# Patient Record
Sex: MALE | Race: BLACK OR AFRICAN AMERICAN | NOT HISPANIC OR LATINO | Employment: FULL TIME | URBAN - METROPOLITAN AREA
[De-identification: names, ages, dates, MRNs, and addresses within clinical notes are randomized per-mention and may not be internally consistent; named-entity substitution may affect disease eponyms.]

---

## 2018-09-13 ENCOUNTER — APPOINTMENT (EMERGENCY)
Dept: CT IMAGING | Facility: HOSPITAL | Age: 43
End: 2018-09-13
Payer: COMMERCIAL

## 2018-09-13 ENCOUNTER — APPOINTMENT (EMERGENCY)
Dept: RADIOLOGY | Facility: HOSPITAL | Age: 43
End: 2018-09-13
Payer: COMMERCIAL

## 2018-09-13 ENCOUNTER — HOSPITAL ENCOUNTER (EMERGENCY)
Facility: HOSPITAL | Age: 43
Discharge: HOME | End: 2018-09-13
Attending: EMERGENCY MEDICINE
Payer: COMMERCIAL

## 2018-09-13 VITALS
OXYGEN SATURATION: 100 % | BODY MASS INDEX: 32.58 KG/M2 | HEIGHT: 68 IN | SYSTOLIC BLOOD PRESSURE: 140 MMHG | HEART RATE: 90 BPM | TEMPERATURE: 97.6 F | DIASTOLIC BLOOD PRESSURE: 85 MMHG | RESPIRATION RATE: 20 BRPM | WEIGHT: 215 LBS

## 2018-09-13 DIAGNOSIS — R13.10 DYSPHAGIA: Primary | ICD-10-CM

## 2018-09-13 DIAGNOSIS — K22.89: ICD-10-CM

## 2018-09-13 LAB
ANION GAP SERPL CALCULATED.3IONS-SCNC: 11 MMOL/L (ref 4–13)
BASOPHILS # BLD AUTO: 0 THOUSANDS/ΜL (ref 0–0.1)
BASOPHILS NFR BLD AUTO: 0 % (ref 0–2)
BUN SERPL-MCNC: 15 MG/DL (ref 7–25)
CALCIUM SERPL-MCNC: 10.3 MG/DL (ref 8.6–10.5)
CHLORIDE SERPL-SCNC: 98 MMOL/L (ref 98–107)
CO2 SERPL-SCNC: 29 MMOL/L (ref 21–31)
CREAT SERPL-MCNC: 1.11 MG/DL (ref 0.7–1.3)
EOSINOPHIL # BLD AUTO: 0 THOUSAND/ΜL (ref 0–0.61)
EOSINOPHIL NFR BLD AUTO: 0 % (ref 0–5)
ERYTHROCYTE [DISTWIDTH] IN BLOOD BY AUTOMATED COUNT: 14 % (ref 11.5–14.5)
GFR SERPL CREATININE-BSD FRML MDRD: 92 ML/MIN/1.73SQ M
GLUCOSE SERPL-MCNC: 105 MG/DL (ref 65–99)
HCT VFR BLD AUTO: 47.6 % (ref 36.5–49.3)
HGB BLD-MCNC: 16.4 G/DL (ref 14–18)
LYMPHOCYTES # BLD AUTO: 1.2 THOUSANDS/ΜL (ref 0.6–4.47)
LYMPHOCYTES NFR BLD AUTO: 9 % (ref 21–51)
MCH RBC QN AUTO: 30.4 PG (ref 26–34)
MCHC RBC AUTO-ENTMCNC: 34.4 G/DL (ref 31–37)
MCV RBC AUTO: 88 FL (ref 81–99)
MONOCYTES # BLD AUTO: 1.1 THOUSAND/ΜL (ref 0.17–1.22)
MONOCYTES NFR BLD AUTO: 9 % (ref 2–12)
NEUTROPHILS # BLD AUTO: 10.1 THOUSANDS/ΜL (ref 1.4–6.5)
NEUTS SEG NFR BLD AUTO: 81 % (ref 42–75)
NRBC BLD AUTO-RTO: 0 /100 WBCS
PLATELET # BLD AUTO: 218 THOUSANDS/UL (ref 149–390)
PMV BLD AUTO: 9.3 FL (ref 8.6–11.7)
POTASSIUM SERPL-SCNC: 4.3 MMOL/L (ref 3.5–5.5)
RBC # BLD AUTO: 5.4 MILLION/UL (ref 4.3–5.9)
SODIUM SERPL-SCNC: 138 MMOL/L (ref 134–143)
WBC # BLD AUTO: 12.5 THOUSAND/UL (ref 4.8–10.8)

## 2018-09-13 PROCEDURE — 96374 THER/PROPH/DIAG INJ IV PUSH: CPT

## 2018-09-13 PROCEDURE — 99284 EMERGENCY DEPT VISIT MOD MDM: CPT

## 2018-09-13 PROCEDURE — 70490 CT SOFT TISSUE NECK W/O DYE: CPT

## 2018-09-13 PROCEDURE — 71045 X-RAY EXAM CHEST 1 VIEW: CPT

## 2018-09-13 PROCEDURE — 96375 TX/PRO/DX INJ NEW DRUG ADDON: CPT

## 2018-09-13 PROCEDURE — 80048 BASIC METABOLIC PNL TOTAL CA: CPT | Performed by: EMERGENCY MEDICINE

## 2018-09-13 PROCEDURE — 36415 COLL VENOUS BLD VENIPUNCTURE: CPT | Performed by: EMERGENCY MEDICINE

## 2018-09-13 PROCEDURE — 85025 COMPLETE CBC W/AUTO DIFF WBC: CPT | Performed by: EMERGENCY MEDICINE

## 2018-09-13 PROCEDURE — C9113 INJ PANTOPRAZOLE SODIUM, VIA: HCPCS | Performed by: EMERGENCY MEDICINE

## 2018-09-13 RX ORDER — PANTOPRAZOLE SODIUM 40 MG/1
40 INJECTION, POWDER, FOR SOLUTION INTRAVENOUS ONCE
Status: COMPLETED | OUTPATIENT
Start: 2018-09-13 | End: 2018-09-13

## 2018-09-13 RX ORDER — PANTOPRAZOLE SODIUM 40 MG/1
40 TABLET, DELAYED RELEASE ORAL DAILY
Qty: 20 TABLET | Refills: 0 | Status: SHIPPED | OUTPATIENT
Start: 2018-09-13 | End: 2021-04-27

## 2018-09-13 RX ORDER — LORAZEPAM 2 MG/ML
1 INJECTION INTRAMUSCULAR ONCE
Status: COMPLETED | OUTPATIENT
Start: 2018-09-13 | End: 2018-09-13

## 2018-09-13 RX ADMIN — LORAZEPAM 1 MG: 2 INJECTION INTRAMUSCULAR; INTRAVENOUS at 19:36

## 2018-09-13 RX ADMIN — PANTOPRAZOLE SODIUM 40 MG: 40 INJECTION, POWDER, FOR SOLUTION INTRAVENOUS at 20:10

## 2018-09-13 RX ADMIN — LIDOCAINE HYDROCHLORIDE 10 ML: 20 SOLUTION ORAL; TOPICAL at 20:49

## 2018-09-13 RX ADMIN — GLUCAGON HYDROCHLORIDE 1 MG: KIT at 19:34

## 2018-09-13 NOTE — ED PROVIDER NOTES
History  Chief Complaint   Patient presents with    Sore Throat     difficulty swallowing, vomiting  since this morning  did not eat prior  Patient presents to the emergency department complaining of sore throat in the right side of his throat pain every time he swallows he thinks that something got stuck in his throat earlier this morning he reports eating meat and cabage but does not definitively no of the time when something might have got gotten stuck in his throat  Patient is able to tolerate liquids but unable to tolerate solids at this time  Patient vomited once this morning and has otherwise been able to tolerate drinking liquids slowly  However drinking liquids causes pain and irritation to his throat  Vomiting   Severity:  Moderate  Duration:  1 day  Timing:  Constant  Quality:  Stomach contents  Progression:  Unchanged  Chronicity:  New  Associated symptoms: sore throat    Associated symptoms: no abdominal pain, no arthralgias, no chills, no cough, no diarrhea, no fever, no headaches and no myalgias        None       History reviewed  No pertinent past medical history  History reviewed  No pertinent surgical history  History reviewed  No pertinent family history  I have reviewed and agree with the history as documented  Social History   Substance Use Topics    Smoking status: Heavy Tobacco Smoker     Packs/day: 0 50    Smokeless tobacco: Never Used    Alcohol use 0 6 oz/week     1 Standard drinks or equivalent per week      Comment: one drink daily        Review of Systems   Constitutional: Negative for activity change, appetite change, chills, fatigue and fever  HENT: Positive for sore throat and trouble swallowing  Negative for congestion, ear pain and rhinorrhea  Eyes: Negative for discharge, redness and visual disturbance  Respiratory: Negative for cough, chest tightness, shortness of breath and wheezing      Cardiovascular: Negative for chest pain and palpitations  Gastrointestinal: Positive for nausea and vomiting  Negative for abdominal pain, constipation and diarrhea  Endocrine: Negative for polydipsia and polyuria  Genitourinary: Negative for difficulty urinating, dysuria, frequency, hematuria and urgency  Musculoskeletal: Negative for arthralgias and myalgias  Skin: Negative for color change, pallor and rash  Neurological: Negative for dizziness, weakness, light-headedness, numbness and headaches  Hematological: Negative for adenopathy  Does not bruise/bleed easily  All other systems reviewed and are negative  Physical Exam  Physical Exam   Constitutional: He is oriented to person, place, and time  He appears well-developed and well-nourished  HENT:   Head: Normocephalic and atraumatic  Right Ear: External ear normal    Left Ear: External ear normal    Nose: Nose normal    Mouth/Throat: Oropharynx is clear and moist    Eyes: Conjunctivae and EOM are normal  Pupils are equal, round, and reactive to light  Neck: Normal range of motion  Neck supple  Cardiovascular: Normal rate, regular rhythm, normal heart sounds and intact distal pulses  Pulmonary/Chest: Effort normal and breath sounds normal  No respiratory distress  He has no wheezes  He has no rales  He exhibits no tenderness  Abdominal: Soft  Bowel sounds are normal  He exhibits no distension  There is no tenderness  There is no guarding  Musculoskeletal: Normal range of motion  Neurological: He is alert and oriented to person, place, and time  No cranial nerve deficit or sensory deficit  Skin: Skin is warm and dry  Psychiatric: He has a normal mood and affect  Nursing note and vitals reviewed        Vital Signs  ED Triage Vitals   Temperature Pulse Respirations Blood Pressure SpO2   09/13/18 1859 09/13/18 1859 09/13/18 1859 09/13/18 1902 09/13/18 1859   (!) 97 3 °F (36 3 °C) 92 20 154/87 98 %      Temp Source Heart Rate Source Patient Position - Orthostatic VS BP Location FiO2 (%)   09/13/18 1859 09/13/18 1859 09/13/18 1902 09/13/18 1902 --   Temporal Monitor Sitting Left arm       Pain Score       09/13/18 1859       Worst Possible Pain           Vitals:    09/13/18 1859 09/13/18 1902   BP:  154/87   Pulse: 92    Patient Position - Orthostatic VS:  Sitting       Visual Acuity      ED Medications  Medications   al mag oxide-diphenhydramine-lidocaine viscous (MAGIC MOUTHWASH) suspension 10 mL (not administered)   glucagon (GLUCAGEN) injection 1 mg (1 mg Intravenous Given 9/13/18 1934)   LORazepam (ATIVAN) 2 mg/mL injection 1 mg (1 mg Intravenous Given 9/13/18 1936)   pantoprazole (PROTONIX) injection 40 mg (40 mg Intravenous Given 9/13/18 2010)       Diagnostic Studies  Results Reviewed     Procedure Component Value Units Date/Time    Basic metabolic panel [[id number]]  (Abnormal) Collected:  09/13/18 1929    Lab Status:  Final result Specimen:  Blood from Arm, Right Updated:  09/13/18 2009     Sodium 138 mmol/L      Potassium 4 3 mmol/L      Chloride 98 mmol/L      CO2 29 mmol/L      ANION GAP 11 mmol/L      BUN 15 mg/dL      Creatinine 1 11 mg/dL      Glucose 105 (H) mg/dL      Calcium 10 3 mg/dL      eGFR 92 ml/min/1 73sq m     Narrative:         National Kidney Disease Education Program recommendations are as follows:  GFR calculation is accurate only with a steady state creatinine  Chronic Kidney disease less than 60 ml/min/1 73 sq  meters  Kidney failure less than 15 ml/min/1 73 sq  meters      CBC and differential [[id number]]  (Abnormal) Collected:  09/13/18 1929    Lab Status:  Final result Specimen:  Blood from Arm, Right Updated:  09/13/18 1953     WBC 12 50 (H) Thousand/uL      RBC 5 40 Million/uL      Hemoglobin 16 4 g/dL      Hematocrit 47 6 %      MCV 88 fL      MCH 30 4 pg      MCHC 34 4 g/dL      RDW 14 0 %      MPV 9 3 fL      Platelets 820 Thousands/uL      nRBC 0 /100 WBCs      Neutrophils Relative 81 (H) %      Lymphocytes Relative 9 (L) %      Monocytes Relative 9 %      Eosinophils Relative 0 %      Basophils Relative 0 %      Neutrophils Absolute 10 10 (H) Thousands/µL      Lymphocytes Absolute 1 20 Thousands/µL      Monocytes Absolute 1 10 Thousand/µL      Eosinophils Absolute 0 00 Thousand/µL      Basophils Absolute 0 00 Thousands/µL                  CT soft tissue neck wo contrast   Final Result by Iza Jo (09/13 2006)      There are a few mildly prominent submandibular lymph nodes  No dominant   adenopathy or mass lesion identified  No abnormal fluid collections  Signed by Iza Jo MD      XR chest 1 view   Final Result by Roberta Browne (09/13 2006)   No acute cardiopulmonary disease  Signed by Merlinda Lieu, MD                 Procedures  Procedures       Phone Contacts  ED Phone Contact    ED Course     437 - spoke with Dr Selina Jon  GI on-call reviewed case and findings in the emergency department he repeat recommends IV Protonix now he believes this may be due to a scratch her laceration to the oropharynx recommends CT of the neck and chest x-ray to rule out radiopaque foreign body at this time and if patient is able to discontinue tolerating liquids feels that patient may be safely discharged home on a soft diet liquids only and follow up with him in the office for further evaluation treatment                              MDM  Number of Diagnoses or Management Options  Dysphagia: new and requires workup  Esophageal laceration: new and requires workup  Diagnosis management comments: Patient remained stable in the emergency department was able to tolerate liquids without any vomiting and keep down fluids advised stick to a clear liquid diet for now and follow up promptly tomorrow with GI for further evaluation and treatment for dysphagia suspect abrasion or laceration to oropharynx are esophagus at this point no foreign body or radiographic abnormality was visualized on CT and x-ray imaging in the emergency department as advised patient remained clinically and hemodynamically stable and was tolerating liquids and will follow up promptly as advised and discussed with GI for further evaluation treatment return precautions and anticipatory guidance discussed  Amount and/or Complexity of Data Reviewed  Clinical lab tests: ordered and reviewed  Tests in the radiology section of CPT®: ordered and reviewed  Tests in the medicine section of CPT®: ordered and reviewed  Decide to obtain previous medical records or to obtain history from someone other than the patient: yes  Review and summarize past medical records: yes  Independent visualization of images, tracings, or specimens: yes    Risk of Complications, Morbidity, and/or Mortality  Presenting problems: moderate  Management options: moderate    Patient Progress  Patient progress: stable    CritCare Time    Disposition  Final diagnoses:   Dysphagia   Esophageal laceration - Possible     Time reflects when diagnosis was documented in both MDM as applicable and the Disposition within this note     Time User Action Codes Description Comment    9/13/2018  8:31 PM Humble Hobson Add [R13 10] Dysphagia     9/13/2018  8:33 PM Humble Hobson Add [K22 8] Esophageal laceration     9/13/2018  8:33 PM Humble Hobson Modify [K22 8] Esophageal laceration Possible      ED Disposition     ED Disposition Condition Comment    Discharge  Hreminio Díaz discharge to home/self care      Condition at discharge: Stable        Follow-up Information     Follow up With Specialties Details Why Brenton Shah MD Gastroenterology Schedule an appointment as soon as possible for a visit in 1 day  [facility] [id number]  [phone]            Patient's Medications   Discharge Prescriptions    AL MAG OXIDE-DIPHENHYDRAMINE-LIDOCAINE VISCOUS (MAGIC MOUTHWASH) 1:1:1 SUSPENSION    Swish and swallow 10 mL every 6 (six) hours as needed for mouth pain or discomfort Start Date: 9/13/2018 End Date: --       Order Dose: 10 mL       Quantity: 180 mL    Refills: 0    PANTOPRAZOLE (PROTONIX) 40 MG TABLET    Take 1 tablet (40 mg total) by mouth daily for 20 days       Start Date: 9/13/2018 End Date: 10/3/2018       Order Dose: 40 mg       Quantity: 20 tablet    Refills: 0     No discharge procedures on file      ED Provider  Electronically Signed by           Michelle Kim DO  09/13/18 8308

## 2018-09-14 NOTE — DISCHARGE INSTRUCTIONS
Dysphagia   WHAT YOU NEED TO KNOW:   Dysphagia is trouble swallowing  It occurs when you have trouble moving food or liquid from your mouth to your esophagus or down to your stomach  It may occur when you eat, drink, or any time you try to swallow  DISCHARGE INSTRUCTIONS:   Return to the emergency department if:   · You choke on your own saliva  · You have chest pain  · You have shortness of breath  · You cannot eat or drink liquids at all  Contact your healthcare provider if:   · You lose weight without trying  · Your signs and symptoms get worse, or you have new signs or symptoms  · You have signs or symptoms of dehydration, such as increased thirst, dark yellow urine, or little or no urine  · You get colds often  · You have questions or concerns about your condition or care  Nutrition:  You may need to change the texture of the foods you eat to help reduce choking problems  Your healthcare provider may show you how to thicken liquids or soften foods to make them easier to swallow  Follow up with your healthcare provider as directed:  Write down your questions so you remember to ask them during your visits  © 2017 [employer] Information is for End User's use only and may not be sold, redistributed or otherwise used for commercial purposes  All illustrations and images included in CareNotes® are the copyrighted property of [employer]  or [employer]  The above information is an  only  It is not intended as medical advice for individual conditions or treatments  Talk to your doctor, nurse or pharmacist before following any medical regimen to see if it is safe and effective for you  Hermila-Allen Syndrome   WHAT YOU NEED TO KNOW:   Hermila-Allen syndrome is a condition that causes a tear in the tissue where your esophagus and stomach meet  The tear causes bleeding that may be mild or severe   Anything that causes forceful vomiting or retching can cause a tear  Movements that cause straining or an injury to your abdomen can also cause a tear  DISCHARGE INSTRUCTIONS:   Return to the emergency department if:   · You are confused or less alert than usual      · Your heartbeat or breathing is faster than usual     · You are lightheaded, dizzy, or faint  · You are sweating and your skin is pale  · Your lips or fingernails are blue  · You are urinating little or not at all  Contact your healthcare provider if:   · You have questions or concerns about your condition or care  Medicines:   · Medicines  may be given to lower the amount of acid your stomach makes  You may also be given medicine to control vomiting and nausea  · Take your medicine as directed  Contact your healthcare provider if you think your medicine is not helping or if you have side effects  Tell him or her if you are allergic to any medicine  Keep a list of the medicines, vitamins, and herbs you take  Include the amounts, and when and why you take them  Bring the list or the pill bottles to follow-up visits  Carry your medicine list with you in case of an emergency  Manage or prevent Hermila-Allen syndrome:   · Rest as needed  Rest will help your body heal  Your healthcare provider may recommend bedrest to prevent movement that can cause or worsen a tear  · Talk to your healthcare provider about all of your medicines  Do not take aspirin or NSAID medicines  These medicines can cause stomach bleeding  They can also thin your blood and keep your blood from clotting normally  Talk to your healthcare provider about any prescription anticoagulant (blood thinning) medicines you take  · Drink more liquids as directed  Liquids help prevent dehydration  You may need to replace body fluid you lost from vomiting or blood loss  Ask your healthcare provider how much liquid to drink each day and which liquids are best for you      · Limit or do not drink alcohol as directed  Alcohol increases your risk for a Hermila-Allen tear  Alcohol use over a long period can cause liver damage  Liver damage also increases your risk for a tear  Ask your healthcare provider if alcohol is safe for you to drink  If you are going to drink alcohol, do not drink large amounts at one time  Limit alcohol to 2 drinks a day if you are a man or 1 drink a day if you are a woman  A drink of alcohol is 12 ounces of beer, 5 ounces of wine, or 1½ ounces of liquor  · Ask your healthcare provider if you should eat soft foods while you heal   Examples of soft foods include applesauce, yogurt, and oatmeal  Do not eat foods that may scratch or irritate your esophagus or stomach  Some examples are crackers, nuts, spicy foods, and citrus fruits such as oranges  Follow up with your healthcare provider as directed: Your healthcare provider may refer you to a specialist to check for more bleeding or damage  Write down your questions so you remember to ask them during your visits  © 2017 [address] [facility] Information is for End User's use only and may not be sold, redistributed or otherwise used for commercial purposes  All illustrations and images included in CareNotes® are the copyrighted property of [employer]  or [address]  The above information is an  only  It is not intended as medical advice for individual conditions or treatments  Talk to your doctor, nurse or pharmacist before following any medical regimen to see if it is safe and effective for you

## 2021-03-26 ENCOUNTER — TELEPHONE (OUTPATIENT)
Dept: GASTROENTEROLOGY | Facility: CLINIC | Age: 46
End: 2021-03-26

## 2021-03-29 ENCOUNTER — TELEPHONE (OUTPATIENT)
Dept: GASTROENTEROLOGY | Facility: CLINIC | Age: 46
End: 2021-03-29

## 2021-03-29 NOTE — TELEPHONE ENCOUNTER
03/29/21  Screened by: Mahogany Gibson    Referring Provider dr Benny Oviedo: There is no height or weight on file to calculate BMI  Has patient been referred for a routine screening Colonoscopy? yes  Is the patient between 39-70 years old? yes      Previous Colonoscopy no   If yes:    Date:     Facility:     Reason:       SCHEDULING STAFF: If the patient is between 45yrs-49yrs, please advise patient to confirm benefits/coverage with their insurance company for a routine screening colonoscopy, some insurance carriers will only cover at [address] or older  If the patient is over 66years old, please schedule an office visit  Does the patient want to see a Gastroenterologist prior to their procedure OR are they having any GI symptoms? yes    Has the patient been hospitalized or had abdominal surgery in the past 6 months? no    Does the patient use supplemental oxygen? no    Does the patient take Coumadin, Lovenox, Plavix, Elliquis, Xarelto, or other blood thinning medication? no    Has the patient had a stroke, cardiac event, or stent placed in the past year? no    SCHEDULING STAFF: If patient answers NO to above questions, then schedule procedure  If patient answers YES to above questions, then schedule office appointment  If patient is between 45yrs - 49yrs, please advise patient that we will have to confirm benefits & coverage with their insurance company for a routine screening colonoscopy

## 2021-04-27 ENCOUNTER — OFFICE VISIT (OUTPATIENT)
Dept: GASTROENTEROLOGY | Facility: CLINIC | Age: 46
End: 2021-04-27
Payer: COMMERCIAL

## 2021-04-27 VITALS
DIASTOLIC BLOOD PRESSURE: 90 MMHG | WEIGHT: 216 LBS | BODY MASS INDEX: 33.9 KG/M2 | HEART RATE: 68 BPM | SYSTOLIC BLOOD PRESSURE: 122 MMHG | HEIGHT: 67 IN

## 2021-04-27 DIAGNOSIS — Z12.11 COLON CANCER SCREENING: Primary | ICD-10-CM

## 2021-04-27 PROCEDURE — 99244 OFF/OP CNSLTJ NEW/EST MOD 40: CPT | Performed by: INTERNAL MEDICINE

## 2021-04-27 NOTE — PROGRESS NOTES
Consultation - [facility]) [facility]  Chivo Villafuertes 1975 50 y o  male     ASSESSMENT @ PLAN:     He is a 47-year-old male who needs colon cancer screening  He has no family history and no symptoms  1  Will do colonoscopy to investigate    Chief Complaint:  Colonoscopy for colon cancer screening    HPI:  He is a 47-year-old male that needs colon cancer screening  He has no symptoms  He has no fevers chills nausea vomiting diarrhea constipation melena hematochezia  He has no family history of colon polyps or colon cancer  He is very active and healthy  REVIEW OF SYSTEMS:     CONSTITUTIONAL: Denies any fever, chills, or rigors  Good appetite, and no recent weight loss  HEENT: No earache or tinnitus  Denies hearing loss or visual disturbances  CARDIOVASCULAR: No chest pain or palpitations  RESPIRATORY: Denies any cough, hemoptysis, shortness of breath or dyspnea on exertion  GASTROINTESTINAL: As noted in the History of Present Illness  GENITOURINARY: No problems with urination  Denies any hematuria or dysuria  NEUROLOGIC: No dizziness or vertigo, denies headaches  MUSCULOSKELETAL: Denies any muscle or joint pain  SKIN: Denies skin rashes or itching  ENDOCRINE: Denies excessive thirst  Denies intolerance to heat or cold  PSYCHOSOCIAL: Denies depression or anxiety  Denies any recent memory loss  History reviewed  No pertinent past medical history     Past Surgical History:   Procedure Laterality Date    ACHILLES TENDON REPAIR       Social History     Socioeconomic History    Marital status: /Civil Union     Spouse name: Not on file    Number of children: Not on file    Years of education: Not on file    Highest education level: Not on file   Occupational History    Not on file   Social Needs    Financial resource strain: Not on file    Food insecurity     Worry: Not on file     Inability: Not on file    Transportation needs     Medical: Not on file Non-medical: Not on file   Tobacco Use    Smoking status: Current Every Day Smoker     Years: 10 00    Smokeless tobacco: Never Used    Tobacco comment: 2 cigarettes daily   Substance and Sexual Activity    Alcohol use: Yes     Alcohol/week: 1 0 standard drinks     Types: 1 Standard drinks or equivalent per week     Comment: one drink daily    Drug use: No    Sexual activity: Not on file   Lifestyle    Physical activity     Days per week: Not on file     Minutes per session: Not on file    Stress: Not on file   Relationships    Social connections     Talks on phone: Not on file     Gets together: Not on file     Attends Faith service: Not on file     Active member of club or organization: Not on file     Attends meetings of clubs or organizations: Not on file     Relationship status: Not on file    Intimate partner violence     Fear of current or ex partner: Not on file     Emotionally abused: Not on file     Physically abused: Not on file     Forced sexual activity: Not on file   Other Topics Concern    Not on file   Social History Narrative    Not on file     Family History   Problem Relation Age of Onset    Breast cancer Mother     Hypertension Mother     Hypertension Father     Diabetes Father      Shellfish allergy - food allergy and Shellfish-derived products - food allergy  No current outpatient medications on file  No current facility-administered medications for this visit  Blood pressure 122/90, pulse 68, height 5' 7" (1 702 m), weight 98 kg (216 lb)      PHYSICAL EXAM:     General Appearance:   Alert, cooperative, no distress, appears stated age    HEENT:   Normocephalic, atraumatic, anicteric      Neck:  Supple, symmetrical, trachea midline, no adenopathy;    thyroid: no enlargement/tenderness/nodules; no carotid  bruit or JVD    Lungs:   Clear to auscultation bilaterally; no rales, rhonchi or wheezing; respirations unlabored    Heart[de-identified]   S1 and S2 normal; regular rate and rhythm; no murmur, rub, or gallop     Abdomen:   Soft, non-tender, non-distended; normal bowel sounds; no masses, no organomegaly    Genitalia:   Deferred    Rectal:   Deferred    Extremities:  No cyanosis, clubbing or edema    Pulses:  2+ and symmetric all extremities    Skin:  Skin color, texture, turgor normal, no rashes or lesions    Lymph nodes:  No palpable cervical, axillary or inguinal lymphadenopathy        Lab Results   Component Value Date    WBC 12 50 (H) 09/13/2018    HGB 16 4 09/13/2018    HCT 47 6 09/13/2018    MCV 88 09/13/2018     09/13/2018     Lab Results   Component Value Date    CALCIUM 10 3 09/13/2018    K 4 3 09/13/2018    CO2 29 09/13/2018    CL 98 09/13/2018    BUN 15 09/13/2018    CREATININE 1 11 09/13/2018     No results found for: ALT, AST, GGT, ALKPHOS, BILITOT  No results found for: INR, PROTIME

## 2021-05-17 ENCOUNTER — TELEPHONE (OUTPATIENT)
Dept: GASTROENTEROLOGY | Facility: CLINIC | Age: 46
End: 2021-05-17

## 2021-05-17 NOTE — TELEPHONE ENCOUNTER
Girma patient - Please call to cancel and reschedule colonoscopy for Thursday 5/20 as his wife broke her ankle and he doesn't have a ride  RTRN call to [phone]   Thx

## 2021-05-19 NOTE — TELEPHONE ENCOUNTER
Spoke with patient and he requested we just cancel the procedure at this time and he will call us back when he knows that he can arrange transportation